# Patient Record
Sex: FEMALE | URBAN - METROPOLITAN AREA
[De-identification: names, ages, dates, MRNs, and addresses within clinical notes are randomized per-mention and may not be internally consistent; named-entity substitution may affect disease eponyms.]

---

## 2024-10-15 ENCOUNTER — APPOINTMENT (OUTPATIENT)
Dept: URBAN - METROPOLITAN AREA CLINIC 317 | Age: 29
Setting detail: DERMATOLOGY
End: 2024-10-15

## 2024-10-15 DIAGNOSIS — L64.8 OTHER ANDROGENIC ALOPECIA: ICD-10-CM

## 2024-10-15 DIAGNOSIS — L30.8 OTHER SPECIFIED DERMATITIS: ICD-10-CM

## 2024-10-15 DIAGNOSIS — L21.8 OTHER SEBORRHEIC DERMATITIS: ICD-10-CM

## 2024-10-15 PROCEDURE — OTHER ORDER TESTS: OTHER

## 2024-10-15 PROCEDURE — OTHER PRESCRIPTION: OTHER

## 2024-10-15 PROCEDURE — OTHER COUNSELING: OTHER

## 2024-10-15 PROCEDURE — OTHER PRESCRIPTION MEDICATION MANAGEMENT: OTHER

## 2024-10-15 PROCEDURE — OTHER OTC TREATMENT REGIMEN: OTHER

## 2024-10-15 PROCEDURE — OTHER MIPS QUALITY: OTHER

## 2024-10-15 PROCEDURE — 99204 OFFICE O/P NEW MOD 45 MIN: CPT

## 2024-10-15 RX ORDER — FLUOCINONIDE 0.5 MG/G
CREAM TOPICAL
Qty: 60 | Refills: 1 | Status: ERX | COMMUNITY
Start: 2024-10-15

## 2024-10-15 RX ORDER — SPIRONOLACTONE 50 MG/1
TABLET, FILM COATED ORAL
Qty: 60 | Refills: 2 | Status: ERX | COMMUNITY
Start: 2024-10-15

## 2024-10-15 RX ORDER — KETOCONAZOLE 20 MG/ML
SHAMPOO, SUSPENSION TOPICAL
Qty: 120 | Refills: 5 | Status: ERX | COMMUNITY
Start: 2024-10-15

## 2024-10-15 ASSESSMENT — LOCATION SIMPLE DESCRIPTION DERM
LOCATION SIMPLE: RIGHT HAND
LOCATION SIMPLE: FRONTAL SCALP
LOCATION SIMPLE: LEFT SCALP
LOCATION SIMPLE: LEFT HAND

## 2024-10-15 ASSESSMENT — LOCATION DETAILED DESCRIPTION DERM
LOCATION DETAILED: RIGHT DORSAL MIDDLE FINGER METACARPOPHALANGEAL JOINT
LOCATION DETAILED: LEFT DORSAL MIDDLE FINGER METACARPOPHALANGEAL JOINT
LOCATION DETAILED: MEDIAL FRONTAL SCALP
LOCATION DETAILED: LEFT MEDIAL FRONTAL SCALP

## 2024-10-15 ASSESSMENT — LOCATION ZONE DERM
LOCATION ZONE: SCALP
LOCATION ZONE: HAND

## 2024-10-15 NOTE — PROCEDURE: ORDER TESTS
Expected Date Of Service: 10/15/2024
Performing Laboratory: 0
Bill For Surgical Tray: no
Billing Type: Third-Party Bill

## 2024-10-15 NOTE — PROCEDURE: PRESCRIPTION MEDICATION MANAGEMENT
Detail Level: Zone
Initiate Treatment: Take Spironolactone 50mg daily for one week, then start 100mg daily as tolerated. Denies other medications or cardiac, renal, or hepatic problems. Aware pregnancy is strongly contraindicated while on this medication. Reports taking this years ago for hair loss and tolerated it well and had improvement in hair loss.
Render In Strict Bullet Format?: No
Initiate Treatment: Apply Ketoconazol 2% shampoo to wet scalp, lather and let sit for 3-5 minutes then rinse. Repeat 2-3 times a week.
Initiate Treatment: Apply Fluocinonide 0.05% cream to affected areas on hands twice daily for up to two weeks. Hold for one week. Repeat if flaring. Avoid face, groin and body folds.
Plan: hand cream - cerave, la roche posay, Neutrogena frequently throughout day. DC lotion with fragrance.

## 2024-10-15 NOTE — PROCEDURE: OTC TREATMENT REGIMEN
Detail Level: Zone
Patient Specific Otc Recommendations (Will Not Stick From Patient To Patient): Recommended Rogain 5% daily.\\n\\nRecommended Nutrafol or Viviscal supplements daily.

## 2025-03-28 ENCOUNTER — RX ONLY (RX ONLY)
Age: 30
End: 2025-03-28

## 2025-03-28 ENCOUNTER — APPOINTMENT (OUTPATIENT)
Dept: URBAN - METROPOLITAN AREA CLINIC 317 | Age: 30
Setting detail: DERMATOLOGY
End: 2025-03-29

## 2025-03-28 DIAGNOSIS — L30.8 OTHER SPECIFIED DERMATITIS: ICD-10-CM

## 2025-03-28 DIAGNOSIS — L90.5 SCAR CONDITIONS AND FIBROSIS OF SKIN: ICD-10-CM

## 2025-03-28 DIAGNOSIS — L21.8 OTHER SEBORRHEIC DERMATITIS: ICD-10-CM

## 2025-03-28 DIAGNOSIS — L64.8 OTHER ANDROGENIC ALOPECIA: ICD-10-CM

## 2025-03-28 PROCEDURE — OTHER OTC TREATMENT REGIMEN: OTHER

## 2025-03-28 PROCEDURE — OTHER COUNSELING: OTHER

## 2025-03-28 PROCEDURE — OTHER MIPS QUALITY: OTHER

## 2025-03-28 PROCEDURE — OTHER ORDER TESTS: OTHER

## 2025-03-28 PROCEDURE — OTHER PRESCRIPTION: OTHER

## 2025-03-28 PROCEDURE — OTHER DIAGNOSIS COMMENT: OTHER

## 2025-03-28 PROCEDURE — 99214 OFFICE O/P EST MOD 30 MIN: CPT

## 2025-03-28 PROCEDURE — OTHER PRESCRIPTION MEDICATION MANAGEMENT: OTHER

## 2025-03-28 RX ORDER — SPIRONOLACTONE 50 MG/1
TABLET, FILM COATED ORAL
Qty: 60 | Refills: 1 | Status: ERX

## 2025-03-28 RX ORDER — KETOCONAZOLE 20 MG/ML
SHAMPOO, SUSPENSION TOPICAL
Qty: 120 | Refills: 5 | Status: ERX

## 2025-03-28 RX ORDER — SPIRONOLACTONE 50 MG/1
TABLET, FILM COATED ORAL
Qty: 60 | Refills: 5 | Status: ACTIVE

## 2025-03-28 ASSESSMENT — LOCATION SIMPLE DESCRIPTION DERM
LOCATION SIMPLE: LEFT SCALP
LOCATION SIMPLE: RIGHT HAND
LOCATION SIMPLE: LEFT HAND
LOCATION SIMPLE: FRONTAL SCALP
LOCATION SIMPLE: POSTERIOR SCALP

## 2025-03-28 ASSESSMENT — LOCATION DETAILED DESCRIPTION DERM
LOCATION DETAILED: LEFT DORSAL MIDDLE FINGER METACARPOPHALANGEAL JOINT
LOCATION DETAILED: RIGHT INFERIOR OCCIPITAL SCALP
LOCATION DETAILED: MEDIAL FRONTAL SCALP
LOCATION DETAILED: LEFT MEDIAL FRONTAL SCALP
LOCATION DETAILED: RIGHT DORSAL MIDDLE FINGER METACARPOPHALANGEAL JOINT
LOCATION DETAILED: LEFT INFERIOR OCCIPITAL SCALP

## 2025-03-28 ASSESSMENT — LOCATION ZONE DERM
LOCATION ZONE: SCALP
LOCATION ZONE: HAND

## 2025-03-28 NOTE — PROCEDURE: PRESCRIPTION MEDICATION MANAGEMENT
Detail Level: Zone
Continue Regimen: Spironolactone 50mg tablets I po 2 tablets once daily - well tolerated, denies side effects, aware pregnancy must be strictly avoided. 
Render In Strict Bullet Format?: No
Continue Regimen: Apply Ketoconazole 2% shampoo to wet scalp, lather and let sit for 3-5 minutes then rinse. Repeat 2-3 times a week.
Continue Regimen: With flares: Apply Fluocinonide 0.05% cream to affected areas on hands twice daily for up to two weeks. Hold for one week. Repeat if flaring. Avoid face, groin and body folds.
Plan: hand cream - cerave, la roche posay, Neutrogena frequently throughout day.

## 2025-03-28 NOTE — PROCEDURE: DIAGNOSIS COMMENT
Render Risk Assessment In Note?: no
Detail Level: Simple
Comment: Monitor for changes. Per pt stable for years, thinks it may be from chickenpox as a child.

## 2025-03-28 NOTE — PROCEDURE: OTC TREATMENT REGIMEN
Detail Level: Zone
Patient Specific Otc Recommendations (Will Not Stick From Patient To Patient): Continue: Recommended Rogain 5% daily.\\n\\nRecommended Nutrafol or Viviscal supplements daily.